# Patient Record
Sex: FEMALE | Race: WHITE | NOT HISPANIC OR LATINO | URBAN - METROPOLITAN AREA
[De-identification: names, ages, dates, MRNs, and addresses within clinical notes are randomized per-mention and may not be internally consistent; named-entity substitution may affect disease eponyms.]

---

## 2017-02-18 ENCOUNTER — EMERGENCY (EMERGENCY)
Facility: HOSPITAL | Age: 37
LOS: 1 days | Discharge: HOME | End: 2017-02-18
Admitting: EMERGENCY MEDICINE
Payer: COMMERCIAL

## 2017-02-18 VITALS
DIASTOLIC BLOOD PRESSURE: 86 MMHG | RESPIRATION RATE: 18 BRPM | HEART RATE: 118 BPM | TEMPERATURE: 98 F | OXYGEN SATURATION: 100 % | SYSTOLIC BLOOD PRESSURE: 132 MMHG

## 2017-02-18 VITALS — TEMPERATURE: 100 F | SYSTOLIC BLOOD PRESSURE: 111 MMHG | HEART RATE: 103 BPM | DIASTOLIC BLOOD PRESSURE: 66 MMHG

## 2017-02-18 LAB
ALBUMIN SERPL ELPH-MCNC: 3.6 G/DL — SIGNIFICANT CHANGE UP (ref 3.3–5)
ALP SERPL-CCNC: 90 U/L — SIGNIFICANT CHANGE UP (ref 40–120)
ALT FLD-CCNC: 11 U/L — SIGNIFICANT CHANGE UP (ref 4–33)
APPEARANCE UR: SIGNIFICANT CHANGE UP
AST SERPL-CCNC: 14 U/L — SIGNIFICANT CHANGE UP (ref 4–32)
BASOPHILS # BLD AUTO: 0.01 K/UL — SIGNIFICANT CHANGE UP (ref 0–0.2)
BASOPHILS NFR BLD AUTO: 0.1 % — SIGNIFICANT CHANGE UP (ref 0–2)
BILIRUB SERPL-MCNC: 0.8 MG/DL — SIGNIFICANT CHANGE UP (ref 0.2–1.2)
BILIRUB UR-MCNC: NEGATIVE — SIGNIFICANT CHANGE UP
BLOOD UR QL VISUAL: HIGH
BUN SERPL-MCNC: 14 MG/DL — SIGNIFICANT CHANGE UP (ref 7–23)
CALCIUM SERPL-MCNC: 8.2 MG/DL — LOW (ref 8.4–10.5)
CHLORIDE SERPL-SCNC: 95 MMOL/L — LOW (ref 98–107)
CO2 SERPL-SCNC: 20 MMOL/L — LOW (ref 22–31)
COLOR SPEC: SIGNIFICANT CHANGE UP
CREAT SERPL-MCNC: 0.71 MG/DL — SIGNIFICANT CHANGE UP (ref 0.5–1.3)
EOSINOPHIL # BLD AUTO: 0.08 K/UL — SIGNIFICANT CHANGE UP (ref 0–0.5)
EOSINOPHIL NFR BLD AUTO: 0.5 % — SIGNIFICANT CHANGE UP (ref 0–6)
GLUCOSE SERPL-MCNC: 328 MG/DL — HIGH (ref 70–99)
GLUCOSE UR-MCNC: >1000 — SIGNIFICANT CHANGE UP
HCT VFR BLD CALC: 38.7 % — SIGNIFICANT CHANGE UP (ref 34.5–45)
HGB BLD-MCNC: 11.5 G/DL — SIGNIFICANT CHANGE UP (ref 11.5–15.5)
IMM GRANULOCYTES NFR BLD AUTO: 0.2 % — SIGNIFICANT CHANGE UP (ref 0–1.5)
KETONES UR-MCNC: SIGNIFICANT CHANGE UP
LEUKOCYTE ESTERASE UR-ACNC: NEGATIVE — SIGNIFICANT CHANGE UP
LIDOCAIN IGE QN: 27.6 U/L — SIGNIFICANT CHANGE UP (ref 7–60)
LYMPHOCYTES # BLD AUTO: 0.91 K/UL — LOW (ref 1–3.3)
LYMPHOCYTES # BLD AUTO: 5.3 % — LOW (ref 13–44)
MCHC RBC-ENTMCNC: 21.1 PG — LOW (ref 27–34)
MCHC RBC-ENTMCNC: 29.7 % — LOW (ref 32–36)
MCV RBC AUTO: 71.1 FL — LOW (ref 80–100)
MONOCYTES # BLD AUTO: 0.62 K/UL — SIGNIFICANT CHANGE UP (ref 0–0.9)
MONOCYTES NFR BLD AUTO: 3.6 % — SIGNIFICANT CHANGE UP (ref 2–14)
MUCOUS THREADS # UR AUTO: SIGNIFICANT CHANGE UP
NEUTROPHILS # BLD AUTO: 15.49 K/UL — HIGH (ref 1.8–7.4)
NEUTROPHILS NFR BLD AUTO: 90.3 % — HIGH (ref 43–77)
NITRITE UR-MCNC: NEGATIVE — SIGNIFICANT CHANGE UP
NON-SQ EPI CELLS # UR AUTO: <1 — SIGNIFICANT CHANGE UP
PH UR: 5.5 — SIGNIFICANT CHANGE UP (ref 4.6–8)
PLATELET # BLD AUTO: 275 K/UL — SIGNIFICANT CHANGE UP (ref 150–400)
PMV BLD: 10.4 FL — SIGNIFICANT CHANGE UP (ref 7–13)
POTASSIUM SERPL-MCNC: 5 MMOL/L — SIGNIFICANT CHANGE UP (ref 3.5–5.3)
POTASSIUM SERPL-SCNC: 5 MMOL/L — SIGNIFICANT CHANGE UP (ref 3.5–5.3)
PROT SERPL-MCNC: 7.3 G/DL — SIGNIFICANT CHANGE UP (ref 6–8.3)
PROT UR-MCNC: NEGATIVE — SIGNIFICANT CHANGE UP
RBC # BLD: 5.44 M/UL — HIGH (ref 3.8–5.2)
RBC # FLD: 16 % — HIGH (ref 10.3–14.5)
RBC CASTS # UR COMP ASSIST: SIGNIFICANT CHANGE UP (ref 0–?)
SODIUM SERPL-SCNC: 134 MMOL/L — LOW (ref 135–145)
SP GR SPEC: 1.02 — SIGNIFICANT CHANGE UP (ref 1–1.03)
SQUAMOUS # UR AUTO: SIGNIFICANT CHANGE UP
UROBILINOGEN FLD QL: NORMAL E.U. — SIGNIFICANT CHANGE UP (ref 0.1–0.2)
WBC # BLD: 17.15 K/UL — HIGH (ref 3.8–10.5)
WBC # FLD AUTO: 17.15 K/UL — HIGH (ref 3.8–10.5)
WBC UR QL: SIGNIFICANT CHANGE UP (ref 0–?)

## 2017-02-18 PROCEDURE — 99284 EMERGENCY DEPT VISIT MOD MDM: CPT

## 2017-02-18 PROCEDURE — 74177 CT ABD & PELVIS W/CONTRAST: CPT | Mod: 26

## 2017-02-18 RX ORDER — ACETAMINOPHEN 500 MG
650 TABLET ORAL ONCE
Qty: 0 | Refills: 0 | Status: COMPLETED | OUTPATIENT
Start: 2017-02-18 | End: 2017-02-18

## 2017-02-18 RX ORDER — SODIUM CHLORIDE 9 MG/ML
1000 INJECTION INTRAMUSCULAR; INTRAVENOUS; SUBCUTANEOUS ONCE
Qty: 0 | Refills: 0 | Status: COMPLETED | OUTPATIENT
Start: 2017-02-18 | End: 2017-02-18

## 2017-02-18 RX ORDER — IBUPROFEN 200 MG
600 TABLET ORAL ONCE
Qty: 0 | Refills: 0 | Status: COMPLETED | OUTPATIENT
Start: 2017-02-18 | End: 2017-02-18

## 2017-02-18 RX ORDER — ACETAMINOPHEN 500 MG
325 TABLET ORAL ONCE
Qty: 0 | Refills: 0 | Status: COMPLETED | OUTPATIENT
Start: 2017-02-18 | End: 2017-02-18

## 2017-02-18 RX ORDER — FAMOTIDINE 10 MG/ML
20 INJECTION INTRAVENOUS ONCE
Qty: 0 | Refills: 0 | Status: COMPLETED | OUTPATIENT
Start: 2017-02-18 | End: 2017-02-18

## 2017-02-18 RX ORDER — ONDANSETRON 8 MG/1
4 TABLET, FILM COATED ORAL ONCE
Qty: 0 | Refills: 0 | Status: COMPLETED | OUTPATIENT
Start: 2017-02-18 | End: 2017-02-18

## 2017-02-18 RX ADMIN — FAMOTIDINE 20 MILLIGRAM(S): 10 INJECTION INTRAVENOUS at 10:58

## 2017-02-18 RX ADMIN — Medication 600 MILLIGRAM(S): at 16:29

## 2017-02-18 RX ADMIN — SODIUM CHLORIDE 1000 MILLILITER(S): 9 INJECTION INTRAMUSCULAR; INTRAVENOUS; SUBCUTANEOUS at 15:38

## 2017-02-18 RX ADMIN — SODIUM CHLORIDE 1000 MILLILITER(S): 9 INJECTION INTRAMUSCULAR; INTRAVENOUS; SUBCUTANEOUS at 10:59

## 2017-02-18 RX ADMIN — Medication 650 MILLIGRAM(S): at 14:16

## 2017-02-18 RX ADMIN — Medication 325 MILLIGRAM(S): at 15:38

## 2017-02-18 RX ADMIN — SODIUM CHLORIDE 1000 MILLILITER(S): 9 INJECTION INTRAMUSCULAR; INTRAVENOUS; SUBCUTANEOUS at 17:48

## 2017-02-18 RX ADMIN — Medication 30 MILLILITER(S): at 10:58

## 2017-02-18 RX ADMIN — ONDANSETRON 4 MILLIGRAM(S): 8 TABLET, FILM COATED ORAL at 10:58

## 2017-02-18 NOTE — ED PROVIDER NOTE - CARE PLAN
Principal Discharge DX:	Gastroenteritis  Instructions for follow-up, activity and diet:	Follow up with your PMD within 48-72 hours.  Rest, increase fluids. Take tylenol 650mg every 6 hours for pain or temp greater than 99.9. Take Zofran 4mg dissolve 1 tab under tongue every 4-6 hours as needed for nausea or vomiting. Take Pepcid 20mg 1 tab 2x/day for 1-2 days as needed for epigastric burning.  Eat bland, small meals as tolerated.  Worsening or continued fever, chills, weakness, nausea, vomiting, abdominal pain return to ER

## 2017-02-18 NOTE — ED PROVIDER NOTE - MEDICAL DECISION MAKING DETAILS
35 y/o female with PMHx of DM type-2 and hypothyroid and PSHx of 2  presents to the ED c/o diffuse intermittent pain x yesterday with nausea and diarrhea. Reports 5 episodes of loose stools since 5 AM. Some chills with dehydration feeling: abdomen: soft nontender: cbc, cmp, ua, lipase, GI cocktail- re eval

## 2017-02-18 NOTE — ED PROVIDER NOTE - PLAN OF CARE
Follow up with your PMD within 48-72 hours.  Rest, increase fluids. Take tylenol 650mg every 6 hours for pain or temp greater than 99.9. Take Zofran 4mg dissolve 1 tab under tongue every 4-6 hours as needed for nausea or vomiting. Take Pepcid 20mg 1 tab 2x/day for 1-2 days as needed for epigastric burning.  Eat bland, small meals as tolerated.  Worsening or continued fever, chills, weakness, nausea, vomiting, abdominal pain return to ER

## 2017-02-18 NOTE — ED PROVIDER NOTE - PROGRESS NOTE DETAILS
The scribe's documentation has been prepared under my direction and personally reviewed by me in its entirety. I confirm that the note above accurately reflects all work, treatment, procedures, and medical decision making performed by me. DANIELITO Plummer Pt still c/o of some pain. Positive generalized tenderness. Ct ordered to r/o any pathology. Pt now having fever. Pt given Tylenol. Awaiting Ct results. DANIELITO Lanza: received pt as signout from DANIELITO Plummer. Pt very well appearing, benign abdomen. Vital signs improved. Labs reassuring with negative CT. Likely viral gastroenteritis. 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  BENIGN ABDOMEN, states abdominal pain is resolved.  Pt feels comfortable for d/c home with PMD follow up. DANIELITO Lanza: received pt as signout from DANIELITO Plummer. Pt very well appearing, benign abdomen.  Labs reassuring with negative CT. Likely viral gastroenteritis. 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  BENIGN ABDOMEN, states abdominal pain is resolved.  Pt feels comfortable for d/c home with PMD follow up. Pt still with oral tempp 100.0, , will give motrin and fluids and d/c with VSS DANIELITO Lanza: VS trending down s/p antipyretics and fluid... pt with low grade temp. Pt feels extremely well, benign abdomen, denies pain. No further vomiting/diarrhea. pt wanting to go home, will dc and have close follow up with PMD.

## 2017-02-18 NOTE — ED PROVIDER NOTE - NS ED MD SCRIBE ATTENDING SCRIBE SECTIONS
VITAL SIGNS( Pullset)/HIV/HISTORY OF PRESENT ILLNESS/DISPOSITION/PHYSICAL EXAM/PAST MEDICAL/SURGICAL/SOCIAL HISTORY/REVIEW OF SYSTEMS

## 2017-02-18 NOTE — ED ADULT TRIAGE NOTE - CHIEF COMPLAINT QUOTE
abd pains started yesterday,subsided,then became worse during night. c/o nausea. denies vomiting,problems urinating. lmp 2/6. reports normal  . c/o weakness abd pains started yesterday,subsided,then became worse during night. c/o nausea. denies vomiting,problems urinating. lmp 2/6. reports normal  . c/o weakness. pmh dm,thyroid abd pains started yesterday,subsided,then became worse during night. c/o nausea. denies vomiting,problems urinating. lmp 2/6. reports normal  . c/o weakness. pmh dm,thyroid  fs 286

## 2020-07-31 ENCOUNTER — RESULT REVIEW (OUTPATIENT)
Age: 40
End: 2020-07-31

## 2021-02-23 ENCOUNTER — APPOINTMENT (OUTPATIENT)
Dept: MAMMOGRAPHY | Facility: CLINIC | Age: 41
End: 2021-02-23
Payer: COMMERCIAL

## 2021-02-23 ENCOUNTER — RESULT REVIEW (OUTPATIENT)
Age: 41
End: 2021-02-23

## 2021-02-23 ENCOUNTER — OUTPATIENT (OUTPATIENT)
Dept: OUTPATIENT SERVICES | Facility: HOSPITAL | Age: 41
LOS: 1 days | End: 2021-02-23
Payer: COMMERCIAL

## 2021-02-23 DIAGNOSIS — Z12.31 ENCOUNTER FOR SCREENING MAMMOGRAM FOR MALIGNANT NEOPLASM OF BREAST: ICD-10-CM

## 2021-02-23 PROCEDURE — 77063 BREAST TOMOSYNTHESIS BI: CPT

## 2021-02-23 PROCEDURE — 77063 BREAST TOMOSYNTHESIS BI: CPT | Mod: 26

## 2021-02-23 PROCEDURE — 77067 SCR MAMMO BI INCL CAD: CPT | Mod: 26

## 2021-02-23 PROCEDURE — 77067 SCR MAMMO BI INCL CAD: CPT

## 2021-03-23 ENCOUNTER — APPOINTMENT (OUTPATIENT)
Dept: ULTRASOUND IMAGING | Facility: CLINIC | Age: 41
End: 2021-03-23

## 2021-04-13 ENCOUNTER — OUTPATIENT (OUTPATIENT)
Dept: OUTPATIENT SERVICES | Facility: HOSPITAL | Age: 41
LOS: 1 days | End: 2021-04-13
Payer: COMMERCIAL

## 2021-04-13 ENCOUNTER — APPOINTMENT (OUTPATIENT)
Dept: ULTRASOUND IMAGING | Facility: CLINIC | Age: 41
End: 2021-04-13
Payer: COMMERCIAL

## 2021-04-13 ENCOUNTER — RESULT REVIEW (OUTPATIENT)
Age: 41
End: 2021-04-13

## 2021-04-13 DIAGNOSIS — Z00.8 ENCOUNTER FOR OTHER GENERAL EXAMINATION: ICD-10-CM

## 2021-04-13 PROCEDURE — 76641 ULTRASOUND BREAST COMPLETE: CPT

## 2021-04-13 PROCEDURE — 76641 ULTRASOUND BREAST COMPLETE: CPT | Mod: 26,LT

## 2021-04-27 ENCOUNTER — RESULT REVIEW (OUTPATIENT)
Age: 41
End: 2021-04-27

## 2021-04-27 ENCOUNTER — OUTPATIENT (OUTPATIENT)
Dept: OUTPATIENT SERVICES | Facility: HOSPITAL | Age: 41
LOS: 1 days | End: 2021-04-27
Payer: COMMERCIAL

## 2021-04-27 ENCOUNTER — APPOINTMENT (OUTPATIENT)
Dept: MAMMOGRAPHY | Facility: CLINIC | Age: 41
End: 2021-04-27
Payer: COMMERCIAL

## 2021-04-27 DIAGNOSIS — R92.8 OTHER ABNORMAL AND INCONCLUSIVE FINDINGS ON DIAGNOSTIC IMAGING OF BREAST: ICD-10-CM

## 2021-04-27 DIAGNOSIS — Z00.8 ENCOUNTER FOR OTHER GENERAL EXAMINATION: ICD-10-CM

## 2021-04-27 PROCEDURE — 19081 BX BREAST 1ST LESION STRTCTC: CPT | Mod: LT

## 2021-04-27 PROCEDURE — 19081 BX BREAST 1ST LESION STRTCTC: CPT

## 2021-04-27 PROCEDURE — 77065 DX MAMMO INCL CAD UNI: CPT | Mod: 26,LT

## 2021-04-27 PROCEDURE — A4648: CPT

## 2021-04-27 PROCEDURE — 88305 TISSUE EXAM BY PATHOLOGIST: CPT | Mod: 26

## 2021-04-27 PROCEDURE — 77065 DX MAMMO INCL CAD UNI: CPT

## 2021-04-27 PROCEDURE — 88305 TISSUE EXAM BY PATHOLOGIST: CPT

## 2021-05-14 ENCOUNTER — OUTPATIENT (OUTPATIENT)
Dept: OUTPATIENT SERVICES | Facility: HOSPITAL | Age: 41
LOS: 1 days | End: 2021-05-14
Payer: COMMERCIAL

## 2021-05-14 ENCOUNTER — APPOINTMENT (OUTPATIENT)
Dept: MRI IMAGING | Facility: CLINIC | Age: 41
End: 2021-05-14
Payer: COMMERCIAL

## 2021-05-14 DIAGNOSIS — Z00.8 ENCOUNTER FOR OTHER GENERAL EXAMINATION: ICD-10-CM

## 2021-05-14 PROCEDURE — 74183 MRI ABD W/O CNTR FLWD CNTR: CPT | Mod: 26

## 2021-05-14 PROCEDURE — 74183 MRI ABD W/O CNTR FLWD CNTR: CPT

## 2021-05-14 PROCEDURE — 72197 MRI PELVIS W/O & W/DYE: CPT

## 2021-05-14 PROCEDURE — A9585: CPT

## 2021-05-14 PROCEDURE — 72197 MRI PELVIS W/O & W/DYE: CPT | Mod: 26

## 2022-04-21 NOTE — ED PROVIDER NOTE - CROS ED RESP ALL NEG
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 Saw [facility] in [place] on 4/7 and was given prednisone and tizanidine.  Not effective. Had been making progress until 2-3 days ago when pain got bad again.  Saw chiropractor today, Dr. Mccarthy. Still has same tingling in fingers, states has already reported this to doctor, not new.      Pt states her sx are same as when she saw Dr. Mccarthy today, still has pain in her neck and back, now rated 9/10 at times.  Still has plugged ears, fatigue.  Her  recommended Covid testing or see  for viral issues or sinus infection.       Pt advised Dr. Casarez is out of the office, and he does not see worker's comp conditions.  Continue with ice, heat, topicals, gentle stretching and movement, ibuprofen as directed by [facility].  If pain is worsening, should contact  or see [facility] again sooner. If pain is too unmanageable, trouble breathing, weakness, worsening numbness or tingling,  would need to be evaluated in ED.   Agrees to plan.      negative...

## 2023-02-28 ENCOUNTER — APPOINTMENT (OUTPATIENT)
Dept: MAMMOGRAPHY | Facility: CLINIC | Age: 43
End: 2023-02-28

## 2023-02-28 ENCOUNTER — OUTPATIENT (OUTPATIENT)
Dept: OUTPATIENT SERVICES | Facility: HOSPITAL | Age: 43
LOS: 1 days | End: 2023-02-28

## 2023-02-28 DIAGNOSIS — Z00.8 ENCOUNTER FOR OTHER GENERAL EXAMINATION: ICD-10-CM

## 2023-03-21 ENCOUNTER — APPOINTMENT (OUTPATIENT)
Dept: PAIN MANAGEMENT | Facility: CLINIC | Age: 43
End: 2023-03-21
Payer: COMMERCIAL

## 2023-03-21 VITALS — BODY MASS INDEX: 36.1 KG/M2 | WEIGHT: 230 LBS | HEIGHT: 67 IN

## 2023-03-21 PROCEDURE — 99203 OFFICE O/P NEW LOW 30 MIN: CPT

## 2023-03-21 NOTE — PHYSICAL EXAM
[UE/LE] : Sensory: Intact in bilateral upper & lower extremities [ALL] : dorsalis pedis, posterior tibial, femoral, popliteal, and radial 2+ and symmetric bilaterally [Cranial Nerves Optic (II)] : visual acuity intact bilaterally,  visual fields full to confrontation, pupils equal round and reactive to light [Cranial Nerves Oculomotor (III)] : extraocular motion intact [Cranial Nerves Trigeminal (V)] : facial sensation intact symmetrically [Cranial Nerves Facial (VII)] : face symmetrical [Cranial Nerves Vestibulocochlear (VIII)] : hearing was intact bilaterally [Cranial Nerves Glossopharyngeal (IX)] : tongue and palate midline [Cranial Nerves Accessory (XI - Cranial And Spinal)] : head turning and shoulder shrug symmetric [Cranial Nerves Hypoglossal (XII)] : there was no tongue deviation with protrusion [Normal] : Normal affect [de-identified] : SLR positive on left, no motor weakness in Lower extremities

## 2023-03-21 NOTE — ASSESSMENT
[FreeTextEntry1] : Patient is s/p SCS trial at Left T12 and S1. Patient endorses days with zero pain. Her pain in back improved by 90% and pain down the legs improved by 80%. Patient overall had improved\par quality of life and improved mood with SCS trial. Patient did note she had some mild pain on her lateral\par shin/ankle that wasn't completely "covered" with SCS but overall was extremely happy with her results and\par plans to proceed with implant. The patient was able to better take care of her own activities of daily living, she\par was able to ambulate longer distances ([employer] in-store shopping). She reports that her overall quality of life\par was improved. She wishes to proceed with the permanent implant. We asked her to obtain a medical clearance for general anesthesia including a ekg, cxr, cbc, bmp, pt/ptt/inr. In the meantime we will obtain auth for SCS implant.\par \par The risks and benefits of scs implant were discussed with the patient in great detail.  These risks included infection, bleeding, hematoma, csf leak, post dural puncture headache, meningitis, nerve damage.  All of the patient's questions were answered to her satisfaction and she stated her verbal understanding of the plan.\par \par The patient was advised to stop all NSAIDS and blood thinners from now until the date of the implant.

## 2023-03-21 NOTE — HISTORY OF PRESENT ILLNESS
[FreeTextEntry1] : Basilia Banegas presents with low back pain. She states that the pain radiates to the left buttock, posterior thigh,\par calf, lateral foot, toes. Basilia Banegas reports having a spinal cord stimulator trial on 1/24/2023. The pain was\par diminished after the intervention by 80%. 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 The symptoms have been present for December 2021 while picking up a child.. She describes the quality of pain as aching, crushing. She states that her current pain score is a level of 7/10. She states that his symptoms are mildly relieved with opioids analgesics. She states that she has undergone the following treatment(s): surgery, physical Therapy/Chiro/HEP for more than 3 months, epidurals. The patient denies fever‚ night sweats‚ or unintended weight loss or gain. \par \par Patient was evaluated by pain management physician/neurosurgeon who had recommended laminectomy. Patient underwent left sided L4/5 laminectomy. Patient had initial relief but shortly after developed left sided lumbar radiculopathy. Patient states she was at "end of the road" with her pain physician. Patient is not a candidate for\par another surgery given symptoms are from recurrent scar tissue from laminectomy causing L5 nerve root\par impingement. Patient underwent SCS trial Left T12, S1 with great success. Patient was able to do more\par activities and walk further and longer without pain. Patient expressed her mood was completely changed and\par she felt "hope" that she wouldn't have to live with this pain the rest of her life. Patient was able to participate in\par activities with her family and even noticed when she went shopping she was able to walk on her own without\par gripping a shopping cart for support.

## 2023-03-24 LAB
ANION GAP SERPL CALC-SCNC: 15 MMOL/L
BUN SERPL-MCNC: 12 MG/DL
CALCIUM SERPL-MCNC: 9.1 MG/DL
CHLORIDE SERPL-SCNC: 104 MMOL/L
CO2 SERPL-SCNC: 22 MMOL/L
CREAT SERPL-MCNC: 0.7 MG/DL
EGFR: 111 ML/MIN/1.73M2
GLUCOSE SERPL-MCNC: 164 MG/DL
HCG SERPL-MCNC: <1 MIU/ML
HCT VFR BLD CALC: 33.8 %
HGB BLD-MCNC: 9.9 G/DL
MCHC RBC-ENTMCNC: 23.4 PG
MCHC RBC-ENTMCNC: 29.3 GM/DL
MCV RBC AUTO: 79.9 FL
PLATELET # BLD AUTO: 304 K/UL
POTASSIUM SERPL-SCNC: 4.3 MMOL/L
RBC # BLD: 4.23 M/UL
RBC # FLD: 15.3 %
SODIUM SERPL-SCNC: 141 MMOL/L
WBC # FLD AUTO: 10.75 K/UL

## 2023-03-27 ENCOUNTER — TRANSCRIPTION ENCOUNTER (OUTPATIENT)
Age: 43
End: 2023-03-27

## 2023-04-17 ENCOUNTER — APPOINTMENT (OUTPATIENT)
Age: 43
End: 2023-04-17

## 2023-04-17 RX ORDER — OXYCODONE AND ACETAMINOPHEN 5; 325 MG/1; MG/1
5-325 TABLET ORAL
Qty: 21 | Refills: 0 | Status: ACTIVE | COMMUNITY
Start: 2023-04-17 | End: 1900-01-01

## 2023-04-17 RX ORDER — SULFAMETHOXAZOLE AND TRIMETHOPRIM 800; 160 MG/1; MG/1
800-160 TABLET ORAL
Qty: 14 | Refills: 0 | Status: ACTIVE | COMMUNITY
Start: 2023-04-17 | End: 1900-01-01

## 2023-05-02 ENCOUNTER — APPOINTMENT (OUTPATIENT)
Dept: PAIN MANAGEMENT | Facility: CLINIC | Age: 43
End: 2023-05-02
Payer: COMMERCIAL

## 2023-05-02 VITALS
DIASTOLIC BLOOD PRESSURE: 89 MMHG | HEART RATE: 88 BPM | OXYGEN SATURATION: 99 % | WEIGHT: 230 LBS | BODY MASS INDEX: 36.1 KG/M2 | SYSTOLIC BLOOD PRESSURE: 150 MMHG | HEIGHT: 67 IN

## 2023-05-02 PROCEDURE — 99214 OFFICE O/P EST MOD 30 MIN: CPT

## 2023-05-02 PROCEDURE — 95971 ALYS SMPL SP/PN NPGT W/PRGRM: CPT

## 2023-05-17 ENCOUNTER — APPOINTMENT (OUTPATIENT)
Dept: VASCULAR SURGERY | Facility: CLINIC | Age: 43
End: 2023-05-17
Payer: COMMERCIAL

## 2023-05-17 VITALS
BODY MASS INDEX: 36.1 KG/M2 | HEIGHT: 67 IN | TEMPERATURE: 98.2 F | DIASTOLIC BLOOD PRESSURE: 82 MMHG | WEIGHT: 230 LBS | HEART RATE: 80 BPM | SYSTOLIC BLOOD PRESSURE: 125 MMHG

## 2023-05-17 PROCEDURE — 99203 OFFICE O/P NEW LOW 30 MIN: CPT

## 2023-05-17 PROCEDURE — 93971 EXTREMITY STUDY: CPT | Mod: LT

## 2023-05-17 NOTE — PHYSICAL EXAM
[Normal Rate and Rhythm] : normal rate and rhythm [2+] : left 2+ [Ankle Swelling (On Exam)] : present [Ankle Swelling On The Left] : of the left ankle [Varicose Veins Of Lower Extremities] : present [Varicose Veins Of The Left Leg] : of the left leg [Ankle Swelling On The Right] : mild [No Rash or Lesion] : No rash or lesion [Alert] : alert [Oriented to Person] : oriented to person [Oriented to Place] : oriented to place [Oriented to Time] : oriented to time [Calm] : calm [] : not present [de-identified] : NAD

## 2023-05-17 NOTE — ASSESSMENT
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

## 2023-05-17 NOTE — HISTORY OF PRESENT ILLNESS
[FreeTextEntry1] : 42F presents with LLE swelling that has been present ever since she had back surgery in Jan of this year. The swelling is worse at the end of the day and sometimes is accompanied by fatigue. She has no complaints on the right side. She denies trauma or previous history of blood clots. No issues with ambulation

## 2023-05-17 NOTE — CONSULT LETTER
[Dear  ___] : Dear  [unfilled], [Consult Letter:] : I had the pleasure of evaluating your patient, [unfilled]. [Please see my note below.] : Please see my note below. [Consult Closing:] : Thank you very much for allowing me to participate in the care of this patient.  If you have any questions, please do not hesitate to contact me. [Sincerely,] : Sincerely, [FreeTextEntry3] : Edel Edmond MD, FSVS, FACS\par Associate Chief, Vascular & Endovascular Surgery\par , Vascular Surgery Fellowship & Residency \par Associate Professor of Surgery,\par [facility] [employer] [facility]\par \par Division of Vascular & Endovascular Surgery\par [facility]\par [address]\[place] [address]\par Tel: [phone]\par \par

## 2023-06-06 ENCOUNTER — APPOINTMENT (OUTPATIENT)
Dept: PAIN MANAGEMENT | Facility: CLINIC | Age: 43
End: 2023-06-06
Payer: COMMERCIAL

## 2023-06-06 VITALS
WEIGHT: 230 LBS | OXYGEN SATURATION: 98 % | HEIGHT: 67 IN | DIASTOLIC BLOOD PRESSURE: 75 MMHG | BODY MASS INDEX: 36.1 KG/M2 | SYSTOLIC BLOOD PRESSURE: 130 MMHG | HEART RATE: 92 BPM

## 2023-06-06 DIAGNOSIS — M25.572 PAIN IN LEFT ANKLE AND JOINTS OF LEFT FOOT: ICD-10-CM

## 2023-06-06 PROCEDURE — 99214 OFFICE O/P EST MOD 30 MIN: CPT

## 2023-06-06 PROCEDURE — 95971 ALYS SMPL SP/PN NPGT W/PRGRM: CPT

## 2023-06-07 PROBLEM — M25.572 LEFT ANKLE PAIN: Status: ACTIVE | Noted: 2023-06-07

## 2023-06-07 NOTE — PHYSICAL EXAM
[de-identified] : Left foot: non pitting edema, PT and DP pulses 2+, appropriate blanching at nail bed. Tenderness to palpation along the lateral aspect of the foot.  Pain with plantar weight bearing.

## 2023-06-07 NOTE — HISTORY OF PRESENT ILLNESS
[FreeTextEntry1] : 44 y/o female with a history of left sided L4/5 laminectomy,  7 week  s/p DRG implant on L T12, S1 on 4/17/23, reports marked improvement in pain symptoms since implant. Currently rates pain 1/10 in severity and pain is only experienced on the lateral side of the L foot. She however does continue to experience some pain around lateral malleolus of left ankle rating it a 5/10. Also continues to experience noticeable left ankle and foot swelling with no numbness and or tingling. Pt expresses that the pain is not nerve pain in nature but that it feels like tightness of the skin around the ankle, toes, and calf, coupled with skin dryness. \par \par Pt went to a cardiovascular doctor to assess for circulation in the leg, and was told that the circulation and temperature of both extremities are comparable and within range. Pt received a sonogram of the L foot. \par Plan to obtain MR L ankle/foot to rule out underlying meir or tendon based complications which could be causing the swelling.

## 2023-06-15 ENCOUNTER — APPOINTMENT (OUTPATIENT)
Dept: MRI IMAGING | Facility: HOSPITAL | Age: 43
End: 2023-06-15

## 2023-06-15 NOTE — REVIEW OF SYSTEMS
[Joint Pain] : joint pain [Joint Stiffness] : joint stiffness [Decreased ROM] : decreased range of motion [Negative] : Heme/Lymph [Back Pain] : no back pain [Muscle Pain] : no muscle pain [Radiating Pain] : no radiating pain [Weakness] : no weakness

## 2023-06-15 NOTE — ASSESSMENT
[FreeTextEntry1] : Patient with chronic low back and left leg pain. She is s/p SCS implant on 4/17/23. Reports improvement in pain symptoms and ability to tolerate ADLs and ambulation. Her surgical site appears to be healing well. We removed staples in office today. She does report occasional pain in left lateral malleolus as well as continues left foot swelling. She was reprogrammed with rep in office to see if that provides better coverage. Patient to follow up in 1 month

## 2023-06-15 NOTE — PHYSICAL EXAM
[UE/LE] : Sensory: Intact in bilateral upper & lower extremities [ALL] : dorsalis pedis, posterior tibial, femoral, popliteal, and radial 2+ and symmetric bilaterally [Cranial Nerves Optic (II)] : visual acuity intact bilaterally,  visual fields full to confrontation, pupils equal round and reactive to light [Cranial Nerves Oculomotor (III)] : extraocular motion intact [Cranial Nerves Trigeminal (V)] : facial sensation intact symmetrically [Cranial Nerves Facial (VII)] : face symmetrical [Cranial Nerves Vestibulocochlear (VIII)] : hearing was intact bilaterally [Cranial Nerves Glossopharyngeal (IX)] : tongue and palate midline [Cranial Nerves Accessory (XI - Cranial And Spinal)] : head turning and shoulder shrug symmetric [Cranial Nerves Hypoglossal (XII)] : there was no tongue deviation with protrusion [Normal] : Normal affect [de-identified] : L foot and ankle swelling \par +ttp left lateral malleolus  [de-identified] : surgical incision sites appear well healing, staples in place. no erythema or drainage

## 2023-06-15 NOTE — HISTORY OF PRESENT ILLNESS
[Back Pain] : back pain [_______] : [unfilled] [___ yrs] : [unfilled] year(s) ago [3] : a current pain level of 3/10 [Sharp] : sharp [Walking] : walking [Rest] : rest [FreeTextEntry1] : Patient with chronic low back and left leg pain. Patient has history of left sided L4/5 laminectomy. Patient had initial relief but shortly after developed left sided lumbar radiculopathy. Patient states she was at "end of the road" with her pain physician. Patient is not a candidate for another surgery given symptoms are from recurrent scar tissue from laminectomy causing L5 nerve root impingement. Patient underwent SCS trial Left T12, S1 with great success. Patient was able to do more activities and walk further and longer without pain.\par \par Patient presents s/p SCS implant on 4/17/23. Reports marked improvement in pain symptoms since implant. Currently rates pain 3/10 in severity. She however does continue to experience some pain around lateral malleolus of left ankle. Also continues to experience left ankle and foot swelling. Patient states since implant she has been able to walk longer distances and perform ADLs with less difficulty.

## 2023-06-29 ENCOUNTER — APPOINTMENT (OUTPATIENT)
Dept: PAIN MANAGEMENT | Facility: CLINIC | Age: 43
End: 2023-06-29
Payer: COMMERCIAL

## 2023-06-29 VITALS
WEIGHT: 235 LBS | HEIGHT: 67 IN | DIASTOLIC BLOOD PRESSURE: 87 MMHG | HEART RATE: 91 BPM | BODY MASS INDEX: 36.88 KG/M2 | OXYGEN SATURATION: 98 % | SYSTOLIC BLOOD PRESSURE: 144 MMHG

## 2023-06-29 DIAGNOSIS — M25.472 EFFUSION, LEFT ANKLE: ICD-10-CM

## 2023-06-29 DIAGNOSIS — M25.572 PAIN IN LEFT ANKLE AND JOINTS OF LEFT FOOT: ICD-10-CM

## 2023-06-29 DIAGNOSIS — G89.4 CHRONIC PAIN SYNDROME: ICD-10-CM

## 2023-06-29 PROCEDURE — 99213 OFFICE O/P EST LOW 20 MIN: CPT | Mod: 1L

## 2023-07-19 ENCOUNTER — APPOINTMENT (OUTPATIENT)
Dept: PAIN MANAGEMENT | Facility: CLINIC | Age: 43
End: 2023-07-19
Payer: COMMERCIAL

## 2023-07-19 DIAGNOSIS — M54.16 RADICULOPATHY, LUMBAR REGION: ICD-10-CM

## 2023-07-19 DIAGNOSIS — G89.4 CHRONIC PAIN SYNDROME: ICD-10-CM

## 2023-07-19 DIAGNOSIS — M96.1 POSTLAMINECTOMY SYNDROME, NOT ELSEWHERE CLASSIFIED: ICD-10-CM

## 2023-07-19 PROCEDURE — 95971 ALYS SMPL SP/PN NPGT W/PRGRM: CPT

## 2023-07-19 PROCEDURE — 99214 OFFICE O/P EST MOD 30 MIN: CPT

## 2023-07-20 PROBLEM — M96.1 POST-LAMINECTOMY SYNDROME: Status: ACTIVE | Noted: 2023-03-21

## 2023-07-20 PROBLEM — G89.4 PAIN SYNDROME, CHRONIC: Status: ACTIVE | Noted: 2023-03-21

## 2023-07-20 PROBLEM — M54.16 LUMBAR RADICULOPATHY, CHRONIC: Status: ACTIVE | Noted: 2023-06-15

## 2023-07-20 NOTE — HISTORY OF PRESENT ILLNESS
[FreeTextEntry1] : 44 y/o s/p discectomy, laminectomy s/p DRG spinal cord stimulator presents today for followup of persistent left foot pain and swelling.  The patient states that since her last visit she has had less pain in the left foot as well as less swelling.  The patient feels that the last reprogramming of the device helped decreased her pain, she also has been wearing a compression sock every night on the left leg.  The patient states that she has been able to walk more, and perform more activities of daily living with less pain.  \par \par She presents today for additional reprogramming of her stimulator.\par

## 2023-07-20 NOTE — PHYSICAL EXAM
[de-identified] : Left foot: non pitting edema, PT and DP pulses 2+, appropriate blanching at nail bed. Tenderness to palpation along the lateral aspect of the foot.  Pain with plantar weight bearing.

## 2023-07-20 NOTE — ASSESSMENT
[FreeTextEntry1] : 44 y/o woman post laminectomy, crps type 2, presents for reprogramming of her device.  She reports improved use of her left foot due to pain. She has improved since her last vist, We reprogrammed her device today with help of the industry representative.  The contacts were changed from ` and 3 to 2 and 4.  We also increased the amplitude range available to her. \par \par Her incisions have healed well.  I have given her permission to swim and start physical therapy.

## 2023-10-20 ENCOUNTER — RESULT REVIEW (OUTPATIENT)
Age: 43
End: 2023-10-20

## 2023-10-20 ENCOUNTER — OUTPATIENT (OUTPATIENT)
Dept: OUTPATIENT SERVICES | Facility: HOSPITAL | Age: 43
LOS: 1 days | End: 2023-10-20
Payer: COMMERCIAL

## 2023-10-20 ENCOUNTER — APPOINTMENT (OUTPATIENT)
Dept: MAMMOGRAPHY | Facility: CLINIC | Age: 43
End: 2023-10-20

## 2023-10-20 DIAGNOSIS — Z00.8 ENCOUNTER FOR OTHER GENERAL EXAMINATION: ICD-10-CM

## 2023-10-20 PROCEDURE — 77067 SCR MAMMO BI INCL CAD: CPT

## 2023-10-20 PROCEDURE — 77063 BREAST TOMOSYNTHESIS BI: CPT

## 2023-10-20 PROCEDURE — 77067 SCR MAMMO BI INCL CAD: CPT | Mod: 26

## 2023-10-20 PROCEDURE — 77063 BREAST TOMOSYNTHESIS BI: CPT | Mod: 26

## 2023-10-25 ENCOUNTER — RESULT REVIEW (OUTPATIENT)
Age: 43
End: 2023-10-25

## 2023-12-27 PROBLEM — G89.4 CHRONIC PAIN DISORDER: Status: ACTIVE | Noted: 2023-03-21

## 2023-12-27 PROBLEM — M25.572 LEFT ANKLE PAIN: Status: ACTIVE | Noted: 2023-06-07

## 2023-12-27 PROBLEM — M25.472 EDEMA OF LEFT ANKLE: Status: ACTIVE | Noted: 2023-06-07

## 2023-12-27 NOTE — ASSESSMENT
[FreeTextEntry1] : The patient's device was reprogrammed today with assistance of the industry representative to try and obtain better pain relief of the left foot pain and ankle swelling.  The patient does give a history of th left foot swelling after any procedure and some of this swelling may be consistent with this history. Yes

## 2023-12-27 NOTE — HISTORY OF PRESENT ILLNESS
[FreeTextEntry1] : 42 y/o female with a history of left sided L4/5 laminectomy,  s/p DRG implant on L T12, S1 on 4/17/23, reports marked improvement in pain symptoms since implant. Currently rates pain 1/10 in severity and pain is only experienced on the lateral side of the L foot. She however does continue to experience some pain around lateral malleolus of left ankle rating it a 5/10. Also continues to experience noticeable left ankle and foot swelling with no numbness and or tingling. Pt expresses that the pain is not nerve pain in nature but that it feels like tightness of the skin around the ankle, toes, and calf, coupled with skin dryness.

## 2023-12-27 NOTE — PHYSICAL EXAM
[de-identified] : Left foot: non pitting edema, PT and DP pulses 2+, appropriate blanching at nail bed. Tenderness to palpation along the lateral aspect of the foot.  Pain with plantar weight bearing.

## 2024-10-10 ENCOUNTER — APPOINTMENT (OUTPATIENT)
Dept: PAIN MANAGEMENT | Facility: CLINIC | Age: 44
End: 2024-10-10

## 2024-10-10 VITALS
HEIGHT: 67 IN | OXYGEN SATURATION: 98 % | WEIGHT: 230 LBS | HEART RATE: 104 BPM | BODY MASS INDEX: 36.1 KG/M2 | DIASTOLIC BLOOD PRESSURE: 93 MMHG | SYSTOLIC BLOOD PRESSURE: 144 MMHG

## 2024-10-10 DIAGNOSIS — M54.16 RADICULOPATHY, LUMBAR REGION: ICD-10-CM

## 2024-10-10 DIAGNOSIS — M25.572 PAIN IN LEFT ANKLE AND JOINTS OF LEFT FOOT: ICD-10-CM

## 2024-10-10 DIAGNOSIS — M96.1 POSTLAMINECTOMY SYNDROME, NOT ELSEWHERE CLASSIFIED: ICD-10-CM

## 2024-10-10 PROCEDURE — 95971 ALYS SMPL SP/PN NPGT W/PRGRM: CPT

## 2024-10-10 PROCEDURE — 99215 OFFICE O/P EST HI 40 MIN: CPT | Mod: 25

## 2024-10-30 ENCOUNTER — RESULT REVIEW (OUTPATIENT)
Age: 44
End: 2024-10-30

## 2024-11-05 ENCOUNTER — APPOINTMENT (OUTPATIENT)
Dept: MRI IMAGING | Facility: CLINIC | Age: 44
End: 2024-11-05
Payer: COMMERCIAL

## 2024-11-05 PROCEDURE — 72148 MRI LUMBAR SPINE W/O DYE: CPT

## 2024-11-12 ENCOUNTER — APPOINTMENT (OUTPATIENT)
Dept: PAIN MANAGEMENT | Facility: CLINIC | Age: 44
End: 2024-11-12
Payer: COMMERCIAL

## 2024-11-12 DIAGNOSIS — M54.16 RADICULOPATHY, LUMBAR REGION: ICD-10-CM

## 2024-11-12 DIAGNOSIS — M96.1 POSTLAMINECTOMY SYNDROME, NOT ELSEWHERE CLASSIFIED: ICD-10-CM

## 2024-11-12 DIAGNOSIS — G89.4 CHRONIC PAIN SYNDROME: ICD-10-CM

## 2024-11-12 PROCEDURE — 99215 OFFICE O/P EST HI 40 MIN: CPT | Mod: 95

## 2024-11-12 RX ORDER — PREGABALIN 50 MG/1
50 CAPSULE ORAL
Qty: 90 | Refills: 0 | Status: ACTIVE | COMMUNITY
Start: 2024-11-12 | End: 1900-01-01

## 2024-11-26 ENCOUNTER — APPOINTMENT (OUTPATIENT)
Dept: PAIN MANAGEMENT | Facility: CLINIC | Age: 44
End: 2024-11-26

## 2024-11-26 PROCEDURE — 62323 NJX INTERLAMINAR LMBR/SAC: CPT

## 2024-11-26 PROCEDURE — 99215 OFFICE O/P EST HI 40 MIN: CPT | Mod: 25

## 2024-11-27 ENCOUNTER — NON-APPOINTMENT (OUTPATIENT)
Age: 44
End: 2024-11-27